# Patient Record
Sex: MALE | Race: WHITE
[De-identification: names, ages, dates, MRNs, and addresses within clinical notes are randomized per-mention and may not be internally consistent; named-entity substitution may affect disease eponyms.]

---

## 2022-07-03 ENCOUNTER — HOSPITAL ENCOUNTER (INPATIENT)
Dept: HOSPITAL 95 - ER | Age: 77
LOS: 2 days | Discharge: HOME | DRG: 871 | End: 2022-07-05
Attending: HOSPITALIST | Admitting: HOSPITALIST
Payer: MEDICARE

## 2022-07-03 VITALS — BODY MASS INDEX: 42.66 KG/M2 | WEIGHT: 315 LBS | HEIGHT: 72 IN

## 2022-07-03 DIAGNOSIS — R65.20: ICD-10-CM

## 2022-07-03 DIAGNOSIS — J96.01: ICD-10-CM

## 2022-07-03 DIAGNOSIS — J12.1: ICD-10-CM

## 2022-07-03 DIAGNOSIS — J45.901: ICD-10-CM

## 2022-07-03 DIAGNOSIS — Z79.899: ICD-10-CM

## 2022-07-03 DIAGNOSIS — A41.89: Primary | ICD-10-CM

## 2022-07-03 DIAGNOSIS — Z99.81: ICD-10-CM

## 2022-07-03 DIAGNOSIS — E66.01: ICD-10-CM

## 2022-07-03 DIAGNOSIS — Z99.89: ICD-10-CM

## 2022-07-03 DIAGNOSIS — J15.9: ICD-10-CM

## 2022-07-03 DIAGNOSIS — Z79.51: ICD-10-CM

## 2022-07-03 DIAGNOSIS — G47.33: ICD-10-CM

## 2022-07-03 DIAGNOSIS — I10: ICD-10-CM

## 2022-07-03 DIAGNOSIS — N40.0: ICD-10-CM

## 2022-07-03 DIAGNOSIS — R94.31: ICD-10-CM

## 2022-07-03 DIAGNOSIS — Z20.822: ICD-10-CM

## 2022-07-03 DIAGNOSIS — Z91.048: ICD-10-CM

## 2022-07-03 DIAGNOSIS — E78.5: ICD-10-CM

## 2022-07-03 LAB
ALBUMIN SERPL BCP-MCNC: 3.6 G/DL (ref 3.4–5)
ALBUMIN/GLOB SERPL: 0.9 {RATIO} (ref 0.8–1.8)
ALT SERPL W P-5'-P-CCNC: 29 U/L (ref 12–78)
ANION GAP SERPL CALCULATED.4IONS-SCNC: 8 MMOL/L (ref 6–16)
AST SERPL W P-5'-P-CCNC: 20 U/L (ref 12–37)
BASOPHILS # BLD AUTO: 0.03 K/MM3 (ref 0–0.23)
BASOPHILS NFR BLD AUTO: 0 % (ref 0–2)
BILIRUB SERPL-MCNC: 0.7 MG/DL (ref 0.1–1)
BUN SERPL-MCNC: 17 MG/DL (ref 8–24)
CALCIUM SERPL-MCNC: 9 MG/DL (ref 8.5–10.1)
CHLORIDE SERPL-SCNC: 106 MMOL/L (ref 98–108)
CO2 SERPL-SCNC: 26 MMOL/L (ref 21–32)
CREAT SERPL-MCNC: 0.89 MG/DL (ref 0.6–1.2)
DEPRECATED RDW RBC AUTO: 45.4 FL (ref 35.1–46.3)
EOSINOPHIL # BLD AUTO: 0.12 K/MM3 (ref 0–0.68)
EOSINOPHIL NFR BLD AUTO: 1 % (ref 0–6)
ERYTHROCYTE [DISTWIDTH] IN BLOOD BY AUTOMATED COUNT: 13.3 % (ref 11.7–14.2)
FLUAV RNA SPEC QL NAA+PROBE: NEGATIVE
FLUBV RNA SPEC QL NAA+PROBE: NEGATIVE
GLOBULIN SER CALC-MCNC: 3.8 G/DL (ref 2.2–4)
GLUCOSE SERPL-MCNC: 142 MG/DL (ref 70–99)
HCT VFR BLD AUTO: 43.6 % (ref 37–53)
HGB BLD-MCNC: 14.6 G/DL (ref 13.5–17.5)
IMM GRANULOCYTES # BLD AUTO: 0.06 K/MM3 (ref 0–0.1)
IMM GRANULOCYTES NFR BLD AUTO: 0 % (ref 0–1)
LYMPHOCYTES # BLD AUTO: 0.94 K/MM3 (ref 0.84–5.2)
LYMPHOCYTES NFR BLD AUTO: 7 % (ref 21–46)
MCHC RBC AUTO-ENTMCNC: 33.5 G/DL (ref 31.5–36.5)
MCV RBC AUTO: 93 FL (ref 80–100)
MONOCYTES # BLD AUTO: 0.34 K/MM3 (ref 0.16–1.47)
MONOCYTES NFR BLD AUTO: 3 % (ref 4–13)
NEUTROPHILS # BLD AUTO: 12.33 K/MM3 (ref 1.96–9.15)
NEUTROPHILS NFR BLD AUTO: 89 % (ref 41–73)
NRBC # BLD AUTO: 0 K/MM3 (ref 0–0.02)
NRBC BLD AUTO-RTO: 0 /100 WBC (ref 0–0.2)
PLATELET # BLD AUTO: 194 K/MM3 (ref 150–400)
POTASSIUM SERPL-SCNC: 3.8 MMOL/L (ref 3.5–5.5)
PROT SERPL-MCNC: 7.4 G/DL (ref 6.4–8.2)
RSV RNA SPEC QL NAA+PROBE: POSITIVE
SARS-COV-2 RNA RESP QL NAA+PROBE: NEGATIVE
SODIUM SERPL-SCNC: 140 MMOL/L (ref 136–145)

## 2022-07-03 PROCEDURE — C8929 TTE W OR WO FOL WCON,DOPPLER: HCPCS

## 2022-07-03 PROCEDURE — A9270 NON-COVERED ITEM OR SERVICE: HCPCS

## 2022-07-03 PROCEDURE — G0378 HOSPITAL OBSERVATION PER HR: HCPCS

## 2022-07-04 LAB
ALBUMIN SERPL BCP-MCNC: 3 G/DL (ref 3.4–5)
ALBUMIN/GLOB SERPL: 0.8 {RATIO} (ref 0.8–1.8)
ALT SERPL W P-5'-P-CCNC: 27 U/L (ref 12–78)
ANION GAP SERPL CALCULATED.4IONS-SCNC: 9 MMOL/L (ref 6–16)
AST SERPL W P-5'-P-CCNC: 18 U/L (ref 12–37)
BASOPHILS # BLD AUTO: 0.05 K/MM3 (ref 0–0.23)
BASOPHILS NFR BLD AUTO: 0 % (ref 0–2)
BILIRUB SERPL-MCNC: 0.6 MG/DL (ref 0.1–1)
BUN SERPL-MCNC: 21 MG/DL (ref 8–24)
CALCIUM SERPL-MCNC: 8.9 MG/DL (ref 8.5–10.1)
CHLORIDE SERPL-SCNC: 106 MMOL/L (ref 98–108)
CK SERPL-CCNC: 144 U/L (ref 39–308)
CK SERPL-CCNC: 235 U/L (ref 39–308)
CO2 SERPL-SCNC: 26 MMOL/L (ref 21–32)
CREAT SERPL-MCNC: 1.03 MG/DL (ref 0.6–1.2)
DEPRECATED RDW RBC AUTO: 48.3 FL (ref 35.1–46.3)
EOSINOPHIL # BLD AUTO: 0 K/MM3 (ref 0–0.68)
EOSINOPHIL NFR BLD AUTO: 0 % (ref 0–6)
ERYTHROCYTE [DISTWIDTH] IN BLOOD BY AUTOMATED COUNT: 13.8 % (ref 11.7–14.2)
GLOBULIN SER CALC-MCNC: 3.7 G/DL (ref 2.2–4)
GLUCOSE SERPL-MCNC: 235 MG/DL (ref 70–99)
HCT VFR BLD AUTO: 40.1 % (ref 37–53)
HGB BLD-MCNC: 13.2 G/DL (ref 13.5–17.5)
IMM GRANULOCYTES # BLD AUTO: 0.27 K/MM3 (ref 0–0.1)
IMM GRANULOCYTES NFR BLD AUTO: 1 % (ref 0–1)
KETONES UR STRIP-MCNC: (no result) MG/DL
LYMPHOCYTES # BLD AUTO: 1.25 K/MM3 (ref 0.84–5.2)
LYMPHOCYTES NFR BLD AUTO: 5 % (ref 21–46)
MCHC RBC AUTO-ENTMCNC: 32.9 G/DL (ref 31.5–36.5)
MCV RBC AUTO: 96 FL (ref 80–100)
MONOCYTES # BLD AUTO: 0.99 K/MM3 (ref 0.16–1.47)
MONOCYTES NFR BLD AUTO: 4 % (ref 4–13)
NEUTROPHILS # BLD AUTO: 24.34 K/MM3 (ref 1.96–9.15)
NEUTROPHILS NFR BLD AUTO: 91 % (ref 41–73)
NRBC # BLD AUTO: 0 K/MM3 (ref 0–0.02)
NRBC BLD AUTO-RTO: 0 /100 WBC (ref 0–0.2)
PLATELET # BLD AUTO: 186 K/MM3 (ref 150–400)
POTASSIUM SERPL-SCNC: 4.2 MMOL/L (ref 3.5–5.5)
PROT SERPL-MCNC: 6.7 G/DL (ref 6.4–8.2)
PROT UR STRIP-MCNC: (no result) MG/DL
RBC #/AREA URNS HPF: (no result) /HPF (ref 0–2)
SODIUM SERPL-SCNC: 141 MMOL/L (ref 136–145)
SP GR SPEC: 1.01 (ref 1–1.02)
UROBILINOGEN UR STRIP-MCNC: (no result) MG/DL
WBC #/AREA URNS HPF: (no result) /HPF (ref 0–5)

## 2022-07-04 NOTE — NUR
SUMMARY- PT INDEPENDANT IN ROOM,. TOLERATING FOOD AND FLUIDS. ROOM AIR ALL
DAY, SATS ON CONT PULSE OX 93-94% CONSISTANTLY. LUNGS DIM IN BASES, EXP WHEEZE
IN LRB AND RHONCHI. STARTING TO CLEAR SOME SECTETIONS TODAY, OFF WHITE, HAD
BLOODY STREAK. ROBITUSSIN THIS PM TO HELP KEEP THIN. STATES HE FEELS GREAT
IMPROVEMENT FROM ADMIT AND IS HOPING HE CAN BE DISCHARGED IN THE AM.

## 2022-07-04 NOTE — NUR
SHIFT SUMMARY
AOX4. VSS. HAD TEMP  IN ER HOWEVER HAS BEEN AFEBRILE ON MED FLOOR. DX c
RSV. REPORTS INCREASED DYSPNEA & "WHEEZY FEELING", SPO2 >90% ON 3L O2 ONLY
WEARS 2L O2 @HS c CPAP @BASELINE & DOESNT WEAR O2 CONTINUOUS, LS DIM c RHONCI
IN UPPER LOBES. STATES BREATHING FEELS BETTER THIS AM AFTER RECIEVING STEROID.
+1 EDEMA BLE. DENIES N/V OR PAIN. IND c URINAL. PLAN TO HAVE ECHO TODAY. CALL
LIGHT IN REACH & PT ABLE TO MAKE NEEDS KNOWN. WILL MONITOR.

## 2022-07-05 LAB
ALBUMIN SERPL BCP-MCNC: 2.9 G/DL (ref 3.4–5)
ANION GAP SERPL CALCULATED.4IONS-SCNC: 6 MMOL/L (ref 6–16)
BUN SERPL-MCNC: 28 MG/DL (ref 8–24)
CALCIUM SERPL-MCNC: 9 MG/DL (ref 8.5–10.1)
CHLORIDE SERPL-SCNC: 105 MMOL/L (ref 98–108)
CO2 SERPL-SCNC: 28 MMOL/L (ref 21–32)
CREAT SERPL-MCNC: 0.98 MG/DL (ref 0.6–1.2)
GLUCOSE SERPL-MCNC: 203 MG/DL (ref 70–99)
PHOSPHATE SERPL-MCNC: 3.1 MG/DL (ref 2.5–4.9)
POTASSIUM SERPL-SCNC: 4.2 MMOL/L (ref 3.5–5.5)
SODIUM SERPL-SCNC: 139 MMOL/L (ref 136–145)

## 2022-07-05 NOTE — NUR
SHIFT SUMMARY
AOX4. VSS. TELE 1ST DEGREE HB @73. DENIES PAIN, N/V. REPORTS BREATHING IS
BETTER THEN YESTERDAY & IS HOPING TO DC HOME. SPO2 >90% ON RA. WORE CPAP c 3L
BLEED IN T/O NIGHT. LS DIM c EXP WHEEZES IN BASES. CALL LIGHT IN REACH. WILL
MONITOR.

## 2022-07-05 NOTE — NUR
SUMMARY- PT HAD ECHO DONE AND DR STOKES NOTED RESULTS AND SPOKE WITH PT ABOUT
FOLLOW-UP WITH HIS PCP- PT WILL F/U AND OBTAIN RECORDS FROM HIS PT PORTAL.
GIVEN DC INSTRUCTIONS. RX SENT TO [facility]. WIFE HERE TO PICK PT UP IN
[facility], AS THEY ARE VISITING FROM [place] AND STAYING AT [facility]. SENT
HOME WITH BELONGINGS INCLUDING CPAP AND CELL PHONE. WHEELCHAIR OUT TO [facility].